# Patient Record
Sex: MALE | Race: BLACK OR AFRICAN AMERICAN | NOT HISPANIC OR LATINO | URBAN - METROPOLITAN AREA
[De-identification: names, ages, dates, MRNs, and addresses within clinical notes are randomized per-mention and may not be internally consistent; named-entity substitution may affect disease eponyms.]

---

## 2021-05-03 ENCOUNTER — PREPPED CHART (OUTPATIENT)
Dept: URBAN - METROPOLITAN AREA CLINIC 19 | Facility: CLINIC | Age: 76
End: 2021-05-03

## 2022-04-18 ASSESSMENT — VISUAL ACUITY
OD_PH: 20/40
OD_CC: 20/40-2
OU_CC: J4
OS_CC: 20/50

## 2022-04-18 ASSESSMENT — TONOMETRY
OS_IOP_MMHG: 10
OD_IOP_MMHG: 10

## 2022-04-20 ENCOUNTER — ESTABLISHED PATIENT (OUTPATIENT)
Dept: URBAN - METROPOLITAN AREA CLINIC 19 | Facility: CLINIC | Age: 77
End: 2022-04-20

## 2022-04-20 DIAGNOSIS — E11.9: ICD-10-CM

## 2022-04-20 DIAGNOSIS — H25.813: ICD-10-CM

## 2022-04-20 DIAGNOSIS — H04.123: ICD-10-CM

## 2022-04-20 DIAGNOSIS — H43.393: ICD-10-CM

## 2022-04-20 PROCEDURE — 92014 COMPRE OPH EXAM EST PT 1/>: CPT

## 2022-04-20 ASSESSMENT — KERATOMETRY
OS_AXISANGLE_DEGREES: 146
OS_K2POWER_DIOPTERS: 42.00
OD_K1POWER_DIOPTERS: 42.00
OD_AXISANGLE_DEGREES: 96
OD_AXISANGLE2_DEGREES: 6
OD_K2POWER_DIOPTERS: 42.25
OS_K1POWER_DIOPTERS: 41.25
OS_AXISANGLE2_DEGREES: 56

## 2022-04-20 ASSESSMENT — VISUAL ACUITY
OS_PH: 20/80
OD_SC: 20/40
OU_SC: 20/40
OS_SC: 20/100

## 2022-04-20 ASSESSMENT — TONOMETRY
OD_IOP_MMHG: 9
OS_IOP_MMHG: 9

## 2022-04-20 NOTE — PATIENT DISCUSSION
Dr Meraz, I see pt was discharged from our practice 6/13/18 & he was instructed to establish with new PCP. Please advise.    The cataracts are responsible for the patient's decrease in vision.

## 2022-05-27 ENCOUNTER — CONSULTATION/EVALUATION (OUTPATIENT)
Dept: URBAN - METROPOLITAN AREA CLINIC 19 | Facility: CLINIC | Age: 77
End: 2022-05-27

## 2022-05-27 DIAGNOSIS — H18.413: ICD-10-CM

## 2022-05-27 DIAGNOSIS — H25.813: ICD-10-CM

## 2022-05-27 DIAGNOSIS — H35.81: ICD-10-CM

## 2022-05-27 PROCEDURE — 92025 CPTRIZED CORNEAL TOPOGRAPHY: CPT

## 2022-05-27 PROCEDURE — 92136 OPHTHALMIC BIOMETRY: CPT

## 2022-05-27 PROCEDURE — 92014 COMPRE OPH EXAM EST PT 1/>: CPT

## 2022-05-27 PROCEDURE — 92134 CPTRZ OPH DX IMG PST SGM RTA: CPT

## 2022-05-27 ASSESSMENT — TONOMETRY
OS_IOP_MMHG: 13
OD_IOP_MMHG: 9

## 2022-05-27 ASSESSMENT — KERATOMETRY
OS_K1POWER_DIOPTERS: 41.50
OS_K2POWER_DIOPTERS: 42.00
OS_AXISANGLE_DEGREES: 126
OD_AXISANGLE_DEGREES: 109
OS_AXISANGLE2_DEGREES: 36
OD_K1POWER_DIOPTERS: 41.75
OD_AXISANGLE2_DEGREES: 19
OD_K2POWER_DIOPTERS: 42.25

## 2022-05-27 ASSESSMENT — VISUAL ACUITY
OU_CC: 20/25-1
OU_SC: 20/25
OU_CC: J1+
OD_SC: 20/30
OS_SC: 20/80
OU_SC: J2
OS_CC: 20/60-1
OD_CC: 20/25

## 2022-05-27 NOTE — PATIENT DISCUSSION
Cataracts are present in both eyes and are clinically significant. Decrease in activities of daily living is documented and the patient would like to book the first eye for cataract surgery and hold a tentative date for the second. There is a clear understanding that if activities of daily living are not impaired by the remaining cataract, surgery will be canceled for the second eye. This will be confirmed after the first cataract surgery is performed.

## 2022-11-18 ENCOUNTER — CONSULTATION/EVALUATION (OUTPATIENT)
Dept: URBAN - METROPOLITAN AREA CLINIC 19 | Facility: CLINIC | Age: 77
End: 2022-11-18

## 2022-11-18 DIAGNOSIS — H35.81: ICD-10-CM

## 2022-11-18 DIAGNOSIS — H25.813: ICD-10-CM

## 2022-11-18 PROCEDURE — 92136 OPHTHALMIC BIOMETRY: CPT

## 2022-11-18 PROCEDURE — 92014 COMPRE OPH EXAM EST PT 1/>: CPT

## 2022-11-18 PROCEDURE — 92134 CPTRZ OPH DX IMG PST SGM RTA: CPT

## 2022-11-18 ASSESSMENT — KERATOMETRY
OD_K1POWER_DIOPTERS: 41.75
OS_AXISANGLE_DEGREES: 126
OS_K2POWER_DIOPTERS: 42.00
OD_AXISANGLE_DEGREES: 109
OD_AXISANGLE2_DEGREES: 19
OS_AXISANGLE2_DEGREES: 36
OD_K2POWER_DIOPTERS: 42.25
OS_K1POWER_DIOPTERS: 41.50

## 2022-11-18 ASSESSMENT — TONOMETRY
OS_IOP_MMHG: 10
OD_IOP_MMHG: 9

## 2022-11-18 ASSESSMENT — VISUAL ACUITY
OU_SC: 20/40-1
OD_SC: 20/50
OU_SC: J3
OU_CC: J1
OS_SC: 20/70-1

## 2022-12-16 ENCOUNTER — POST-OP (OUTPATIENT)
Dept: URBAN - METROPOLITAN AREA CLINIC 19 | Facility: CLINIC | Age: 77
End: 2022-12-16

## 2022-12-16 PROCEDURE — 99024 POSTOP FOLLOW-UP VISIT: CPT

## 2022-12-16 ASSESSMENT — KERATOMETRY
OS_K2POWER_DIOPTERS: 42.00
OD_AXISANGLE_DEGREES: 109
OD_AXISANGLE2_DEGREES: 19
OD_K1POWER_DIOPTERS: 41.75
OS_K1POWER_DIOPTERS: 41.50
OS_AXISANGLE2_DEGREES: 36
OS_AXISANGLE_DEGREES: 126
OD_K2POWER_DIOPTERS: 42.25

## 2022-12-16 ASSESSMENT — TONOMETRY
OS_IOP_MMHG: 8
OD_IOP_MMHG: 10

## 2022-12-16 ASSESSMENT — VISUAL ACUITY
OS_SC: 20/60+1
OU_SC: 20/25
OD_SC: 20/30

## 2022-12-22 ENCOUNTER — POST-OP (OUTPATIENT)
Dept: URBAN - METROPOLITAN AREA CLINIC 19 | Facility: CLINIC | Age: 77
End: 2022-12-22

## 2022-12-22 PROCEDURE — 99024 POSTOP FOLLOW-UP VISIT: CPT

## 2022-12-22 ASSESSMENT — TONOMETRY
OD_IOP_MMHG: 9
OS_IOP_MMHG: 11

## 2022-12-22 ASSESSMENT — VISUAL ACUITY
OS_SC: 20/50-1
OS_PH: 20/25+1

## 2022-12-30 ENCOUNTER — POST-OP (OUTPATIENT)
Dept: URBAN - METROPOLITAN AREA CLINIC 19 | Facility: CLINIC | Age: 77
End: 2022-12-30

## 2022-12-30 PROCEDURE — 99024 POSTOP FOLLOW-UP VISIT: CPT

## 2022-12-30 ASSESSMENT — VISUAL ACUITY
OU_SC: 20/30-2
OU_SC: 20/40
OD_SC: 20/40
OD_PH: 20/40
OS_PH: 20/40
OS_SC: 20/50-2

## 2022-12-30 ASSESSMENT — TONOMETRY
OD_IOP_MMHG: 7
OS_IOP_MMHG: 8

## 2024-03-28 ENCOUNTER — ESTABLISHED PATIENT (OUTPATIENT)
Dept: URBAN - METROPOLITAN AREA CLINIC 19 | Facility: CLINIC | Age: 79
End: 2024-03-28

## 2024-03-28 DIAGNOSIS — H16.223: ICD-10-CM

## 2024-03-28 DIAGNOSIS — H52.4: ICD-10-CM

## 2024-03-28 DIAGNOSIS — H26.493: ICD-10-CM

## 2024-03-28 PROCEDURE — 92014 COMPRE OPH EXAM EST PT 1/>: CPT

## 2024-03-28 PROCEDURE — 92015 DETERMINE REFRACTIVE STATE: CPT

## 2024-03-28 ASSESSMENT — KERATOMETRY
OD_AXISANGLE2_DEGREES: 57
OS_K2POWER_DIOPTERS: 42.00
OD_K1POWER_DIOPTERS: 42.50
OD_K2POWER_DIOPTERS: 42.00
OS_K1POWER_DIOPTERS: 42.50
OS_AXISANGLE_DEGREES: 30
OS_AXISANGLE2_DEGREES: 120
OD_AXISANGLE_DEGREES: 147

## 2024-03-28 ASSESSMENT — TONOMETRY
OD_IOP_MMHG: 9
OS_IOP_MMHG: 9

## 2024-03-28 ASSESSMENT — VISUAL ACUITY
OS_CC: 20/25-1
OU_CC: 20/20
OD_CC: 20/25-1

## 2025-04-23 ENCOUNTER — COMPREHENSIVE EXAM (OUTPATIENT)
Age: 80
End: 2025-04-23

## 2025-04-23 DIAGNOSIS — H52.03: ICD-10-CM

## 2025-04-23 DIAGNOSIS — H52.4: ICD-10-CM

## 2025-04-23 DIAGNOSIS — Z96.1: ICD-10-CM

## 2025-04-23 DIAGNOSIS — E11.9: ICD-10-CM

## 2025-04-23 DIAGNOSIS — H52.203: ICD-10-CM

## 2025-04-23 PROCEDURE — 92015 DETERMINE REFRACTIVE STATE: CPT

## 2025-04-23 PROCEDURE — 99214 OFFICE O/P EST MOD 30 MIN: CPT

## 2025-06-24 ENCOUNTER — CONTACT LENSES/GLASSES VISIT (OUTPATIENT)
Age: 80
End: 2025-06-24

## 2025-06-24 DIAGNOSIS — H52.03: ICD-10-CM

## 2025-06-24 PROCEDURE — 99211NC NO CHARGE VISIT
